# Patient Record
Sex: MALE | Race: WHITE | NOT HISPANIC OR LATINO | Employment: FULL TIME | URBAN - METROPOLITAN AREA
[De-identification: names, ages, dates, MRNs, and addresses within clinical notes are randomized per-mention and may not be internally consistent; named-entity substitution may affect disease eponyms.]

---

## 2018-01-13 NOTE — MISCELLANEOUS
Message   Recorded as Task   Date: 06/13/2016 09:15 AM, Created By: Dvaid Clay   Task Name: Miscellaneous   Assigned To: David Clay   Regarding Patient: Munir Allred, Status: Active   Comment:    David Clay - 13 Jun 2016 9:15 AM     TASK CREATED  PT WIFE CALLED TO CX PT APPT, HE WAS SENT TO [place] FOR WORK TODAY AND WASNT SURE THAT HE WOULD MAKE IT BACK IN TIME FOR THE APPT  HE WILL CB TO RESCHED WHEN HE IS BACK IN [place]  Sudhir Le - 13 Jun 2016 9:32 AM     TASK REPLIED TO: Previously Assigned To Sudhir Le  aware     Signatures   Electronically signed by :  Nely Gil, ; Jun 13 2016  9:34AM EST                       (Author)    Electronically signed by : Isabelle Barid DO; Jun 13 2016 10:05AM EST

## 2020-11-24 ENCOUNTER — NURSE TRIAGE (OUTPATIENT)
Dept: OTHER | Facility: OTHER | Age: 50
End: 2020-11-24

## 2020-11-24 DIAGNOSIS — Z20.822 EXPOSURE TO COVID-19 VIRUS: ICD-10-CM

## 2020-11-24 DIAGNOSIS — Z20.822 EXPOSURE TO COVID-19 VIRUS: Primary | ICD-10-CM

## 2020-11-24 PROCEDURE — U0003 INFECTIOUS AGENT DETECTION BY NUCLEIC ACID (DNA OR RNA); SEVERE ACUTE RESPIRATORY SYNDROME CORONAVIRUS 2 (SARS-COV-2) (CORONAVIRUS DISEASE [COVID-19]), AMPLIFIED PROBE TECHNIQUE, MAKING USE OF HIGH THROUGHPUT TECHNOLOGIES AS DESCRIBED BY CMS-2020-01-R: HCPCS | Performed by: FAMILY MEDICINE

## 2020-11-25 LAB — SARS-COV-2 RNA SPEC QL NAA+PROBE: NOT DETECTED

## 2020-12-11 ENCOUNTER — TELEMEDICINE (OUTPATIENT)
Dept: GASTROENTEROLOGY | Facility: CLINIC | Age: 50
End: 2020-12-11

## 2020-12-11 VITALS — WEIGHT: 200 LBS | HEIGHT: 66 IN | BODY MASS INDEX: 32.14 KG/M2

## 2020-12-11 DIAGNOSIS — Z83.71 FAMILY HISTORY OF COLONIC POLYPS: Primary | ICD-10-CM

## 2020-12-11 RX ORDER — CHLORAL HYDRATE 500 MG
1000 CAPSULE ORAL
COMMUNITY

## 2020-12-11 RX ORDER — PSYLLIUM HUSK 0.4 G
3000 CAPSULE ORAL DAILY
COMMUNITY

## 2020-12-11 RX ORDER — SODIUM, POTASSIUM,MAG SULFATES 17.5-3.13G
SOLUTION, RECONSTITUTED, ORAL ORAL
Qty: 2 BOTTLE | Refills: 0 | Status: SHIPPED | OUTPATIENT
Start: 2020-12-11 | End: 2020-12-18 | Stop reason: HOSPADM

## 2020-12-11 RX ORDER — LISINOPRIL 10 MG/1
10 TABLET ORAL DAILY
COMMUNITY
Start: 2020-11-11

## 2020-12-11 RX ORDER — DULAGLUTIDE 0.75 MG/.5ML
INJECTION, SOLUTION SUBCUTANEOUS
COMMUNITY
Start: 2020-10-13

## 2020-12-11 RX ORDER — SIMVASTATIN 10 MG
10 TABLET ORAL EVERY EVENING
COMMUNITY
Start: 2020-11-11

## 2020-12-18 ENCOUNTER — ANESTHESIA (OUTPATIENT)
Dept: GASTROENTEROLOGY | Facility: AMBULATORY SURGERY CENTER | Age: 50
End: 2020-12-18

## 2020-12-18 ENCOUNTER — HOSPITAL ENCOUNTER (OUTPATIENT)
Dept: GASTROENTEROLOGY | Facility: AMBULATORY SURGERY CENTER | Age: 50
Discharge: HOME | End: 2020-12-18
Payer: COMMERCIAL

## 2020-12-18 ENCOUNTER — ANESTHESIA EVENT (OUTPATIENT)
Dept: GASTROENTEROLOGY | Facility: AMBULATORY SURGERY CENTER | Age: 50
End: 2020-12-18

## 2020-12-18 VITALS
RESPIRATION RATE: 18 BRPM | DIASTOLIC BLOOD PRESSURE: 68 MMHG | OXYGEN SATURATION: 97 % | HEART RATE: 71 BPM | SYSTOLIC BLOOD PRESSURE: 129 MMHG

## 2020-12-18 VITALS — HEART RATE: 90 BPM

## 2020-12-18 DIAGNOSIS — Z12.11 SCREENING FOR COLON CANCER: ICD-10-CM

## 2020-12-18 PROBLEM — E66.811 OBESITY (BMI 30.0-34.9): Status: ACTIVE | Noted: 2020-12-18

## 2020-12-18 PROBLEM — E78.5 HYPERLIPEMIA: Status: ACTIVE | Noted: 2020-12-18

## 2020-12-18 PROBLEM — E83.119 HEMOCHROMATOSIS: Status: ACTIVE | Noted: 2020-12-18

## 2020-12-18 PROBLEM — E11.9 DM (DIABETES MELLITUS) (HCC): Status: ACTIVE | Noted: 2020-12-18

## 2020-12-18 PROBLEM — E66.9 OBESITY (BMI 30.0-34.9): Status: ACTIVE | Noted: 2020-12-18

## 2020-12-18 PROCEDURE — 88305 TISSUE EXAM BY PATHOLOGIST: CPT | Performed by: PATHOLOGY

## 2020-12-18 PROCEDURE — 45385 COLONOSCOPY W/LESION REMOVAL: CPT | Performed by: INTERNAL MEDICINE

## 2020-12-18 PROCEDURE — 45380 COLONOSCOPY AND BIOPSY: CPT | Performed by: INTERNAL MEDICINE

## 2020-12-18 RX ORDER — PROPOFOL 10 MG/ML
INJECTION, EMULSION INTRAVENOUS AS NEEDED
Status: DISCONTINUED | OUTPATIENT
Start: 2020-12-18 | End: 2020-12-18

## 2020-12-18 RX ORDER — SODIUM CHLORIDE 9 MG/ML
75 INJECTION, SOLUTION INTRAVENOUS CONTINUOUS
Status: DISCONTINUED | OUTPATIENT
Start: 2020-12-18 | End: 2020-12-22 | Stop reason: HOSPADM

## 2020-12-18 RX ADMIN — PROPOFOL 100 MG: 10 INJECTION, EMULSION INTRAVENOUS at 09:29

## 2020-12-18 RX ADMIN — PROPOFOL 50 MG: 10 INJECTION, EMULSION INTRAVENOUS at 09:39

## 2020-12-18 RX ADMIN — SODIUM CHLORIDE 75 ML/HR: 9 INJECTION, SOLUTION INTRAVENOUS at 09:24

## 2020-12-18 RX ADMIN — PROPOFOL 50 MG: 10 INJECTION, EMULSION INTRAVENOUS at 09:32

## 2020-12-18 RX ADMIN — PROPOFOL 50 MG: 10 INJECTION, EMULSION INTRAVENOUS at 09:35

## 2025-07-07 ENCOUNTER — CONSULT (OUTPATIENT)
Dept: GASTROENTEROLOGY | Facility: CLINIC | Age: 55
End: 2025-07-07
Payer: COMMERCIAL

## 2025-07-07 ENCOUNTER — TELEPHONE (OUTPATIENT)
Dept: GASTROENTEROLOGY | Facility: CLINIC | Age: 55
End: 2025-07-07

## 2025-07-07 VITALS
HEIGHT: 66 IN | DIASTOLIC BLOOD PRESSURE: 56 MMHG | BODY MASS INDEX: 32.62 KG/M2 | SYSTOLIC BLOOD PRESSURE: 138 MMHG | WEIGHT: 203 LBS

## 2025-07-07 DIAGNOSIS — K62.5 RECTAL BLEEDING: Primary | ICD-10-CM

## 2025-07-07 DIAGNOSIS — E83.119 HEMOCHROMATOSIS, UNSPECIFIED HEMOCHROMATOSIS TYPE: ICD-10-CM

## 2025-07-07 DIAGNOSIS — D45 POLYCYTHEMIA VERA (HCC): ICD-10-CM

## 2025-07-07 DIAGNOSIS — Z86.0100 HISTORY OF COLON POLYPS: ICD-10-CM

## 2025-07-07 PROBLEM — E83.118 OTHER HEMOCHROMATOSIS: Status: ACTIVE | Noted: 2020-12-18

## 2025-07-07 PROCEDURE — 99243 OFF/OP CNSLTJ NEW/EST LOW 30: CPT | Performed by: INTERNAL MEDICINE

## 2025-07-07 RX ORDER — SODIUM CHLORIDE, SODIUM LACTATE, POTASSIUM CHLORIDE, CALCIUM CHLORIDE 600; 310; 30; 20 MG/100ML; MG/100ML; MG/100ML; MG/100ML
125 INJECTION, SOLUTION INTRAVENOUS CONTINUOUS
Status: CANCELLED | OUTPATIENT
Start: 2025-07-07

## 2025-07-07 RX ORDER — DAPAGLIFLOZIN 10 MG/1
TABLET, FILM COATED ORAL
COMMUNITY
Start: 2025-06-27

## 2025-07-07 NOTE — PROGRESS NOTES
[facility] - Outpatient Consultation  Umberto Pendleton Jr. 54 y.o. male MRN: [id number]  Encounter: [id number]    ASSESSMENT AND PLAN:      1. Rectal bleeding (Primary)  54-year-old male with a history of colon polyps last colonoscopy 5 years ago with worsening painless rectal bleeding.  The story sounds consistent with hemorrhoidal bleeding.  He is due for surveillance colonoscopy for his polyps anyway plan to schedule colonoscopy we discussed the merits of fiber with respect to hemorrhoids.  I also discussed the option of banding.  For starters proceed with colonoscopy further recommendations to follow  - Colonoscopy; Future  - sodium picosulfate, magnesium oxide, citric acid (Clenpiq) oral solution; Take 175 mL (1 bottle) the evening before the colonoscopy, between 5 PM and 9 PM, followed by a second 175 mL bottle 5 hours before the colonoscopy.  Dispense: 350 mL; Refill: 0    2. History of colon polyps  Adenomas 2028 recall colonoscopy scheduled    3. Polycythemia vera (HCC)  Has a history of this treated with the Besemi which is apparently a type of interferon    4. Hemochromatosis, unspecified hemochromatosis type  History of this has received phlebotomy in the past thought to be related to his P vera.  His LFTs are normal and CAT scan in May did not show liver disease.  I cannot find a compelling reason to do upper endoscopy with respect to esophageal varices.      Followup Appointment: 2 to 3 months  ______________________________________________________________________    Chief Complaint   Patient presents with    Rectal Bleeding     Pt has been having rectal bleeding on and off for a yr / may have a hemorrhoid        HPI:   Umberto Pendleton Jr. is a very pleasant 54 y.o. year old male who presents with rectal bleeding.  He reports over the last several months he has noticed intermittent rectal bleeding usually without any warning.  He will notice a spot either  "on his clothing or where he has been sitting.  He denies any problems with constipation or diarrhea.  In May he had a fairly large episode and was evaluated in the emergency room.  CT scan was negative blood count was normal.  He had colonoscopy 5 years ago with removal of several polyps by Dr. Storm.  Presently no abdominal pain no upper tract symptoms.    Historical Information   Past Medical History[1]  Past Surgical History[2]  Social History     Substance and Sexual Activity   Alcohol Use Yes    Comment: social     Social History     Substance and Sexual Activity   Drug Use Never     Tobacco Use History[3]  Family History[4]    Meds/Allergies   Current Medications[5]    Allergies[6]    PHYSICAL EXAM:    Blood pressure 138/56, height 5' 6\" (1.676 m), weight 92.1 kg (203 lb). Body mass index is 32.77 kg/m².  General Appearance: NAD, cooperative, alert  Eyes: Anicteric, conjunctiva pink   ENT:  Normocephalic, atraumatic, normal mucosa.    Neck:  Supple, symmetrical, trachea midline,   Resp:  Clear to auscultation bilaterally; no rales, rhonchi or wheezing; respirations unlabored   CV:  S1 S2, Regular rate and rhythm; no murmur, rub, or gallop.  GI:  Soft, non-tender, non-distended; normal bowel sounds; no masses, no organomegaly   Rectal: Deferred  Musculoskeletal: No cyanosis, clubbing or edema. Normal ROM.  Skin:  No jaundice, rashes, or lesions   Heme/Lymph: No palpable cervical lymphadenopathy  Psych: Normal affect, good eye contact  Neuro: No gross deficits, AAOx3    Lab Results:   No results found for: \"WBC\", \"HGB\", \"HCT\", \"MCV\", \"PLT\"  Lab Results   Component Value Date    K 4.8 07/01/2025     (L) 07/01/2025    CO2 30 07/01/2025    BUN 26 (H) 07/01/2025    CREATININE 1.35 (H) 07/01/2025    CALCIUM 10.1 07/01/2025    AST 20 07/01/2025    ALT 26 07/01/2025    ALKPHOS 48 07/01/2025    EGFR 62 07/01/2025         Radiology Results:   No results found.      REVIEW OF SYSTEMS:    CONSTITUTIONAL: Denies any " fever, chills, rigors, and weight loss.  HEENT: No earache or tinnitus. Denies hearing loss or visual disturbances.  CARDIOVASCULAR: No chest pain or palpitations.   RESPIRATORY: Denies any cough, hemoptysis, shortness of breath or dyspnea on exertion.  GASTROINTESTINAL: As noted in the History of Present Illness.   GENITOURINARY: No problems with urination. Denies any hematuria or dysuria.  NEUROLOGIC: No dizziness or vertigo, denies headaches.   MUSCULOSKELETAL: Denies any muscle or joint pain.   SKIN: Denies skin rashes or itching.   ENDOCRINE: Denies excessive thirst. Denies intolerance to heat or cold.  PSYCHOSOCIAL: Denies depression or anxiety. Denies any recent memory loss.          [1]   Past Medical History:  Diagnosis Date    Diabetes mellitus (HCC)     type 2    GI (gastrointestinal bleed)     Hemochromatosis     Hyperlipemia 12/18/2020    Hypertension     Polycythemia    [2]   Past Surgical History:  Procedure Laterality Date    COLONOSCOPY  12/18/2020    COLONOSCOPY      HAND SURGERY  1986   [3]   Social History  Tobacco Use   Smoking Status Former    Current packs/day: 0.50    Average packs/day: 0.5 packs/day for 5.0 years (2.5 ttl pk-yrs)    Types: Cigarettes, Cigars   Smokeless Tobacco Former    Types: Chew   [4]   Family History  Problem Relation Name Age of Onset    Colon polyps Mother Demetra     Colon cancer Paternal Grandfather      Diabetes Father Umberto     Cancer Father Umberto     Hypertension Father Umberto     COPD Father Umberto     Early death Father Umberto         age 52    Heart disease Father Umberto     Kidney disease Father Umberto     No Known Problems Sister      No Known Problems Brother     [5]   Current Outpatient Medications:     Aspirin Buf,CaCarb-MgCarb-MgO, 81 MG TABS    Cholecalciferol (Vitamin D-1000 Max St) 25 MCG (1000 UT) tablet    dapagliflozin (Farxiga) 10 MG tablet    lisinopril (ZESTRIL) 10 mg tablet    metFORMIN (GLUCOPHAGE) 1000 MG tablet    Omega-3 Fatty Acids  (fish oil) 1,000 mg    Ropeginterferon wpnx-3o-vskd (BESREMi) 500 MCG/ML subcutaneous injection    simvastatin (ZOCOR) 10 mg tablet    sodium picosulfate, magnesium oxide, citric acid (Clenpiq) oral solution    Trulicity 0.75 MG/0.5ML SOPN  [6]   Allergies  Allergen Reactions    Oxycodone-Acetaminophen Other (See Comments)     Other reaction(s): violent dreams  agitation      Penicillins      Other reaction(s): vomiting

## 2025-07-07 NOTE — H&P (VIEW-ONLY)
[facility] - Outpatient Consultation  Umberto Pendleton Jr. 54 y.o. male MRN: [id number]  Encounter: [id number]    ASSESSMENT AND PLAN:      1. Rectal bleeding (Primary)  54-year-old male with a history of colon polyps last colonoscopy 5 years ago with worsening painless rectal bleeding.  The story sounds consistent with hemorrhoidal bleeding.  He is due for surveillance colonoscopy for his polyps anyway plan to schedule colonoscopy we discussed the merits of fiber with respect to hemorrhoids.  I also discussed the option of banding.  For starters proceed with colonoscopy further recommendations to follow  - Colonoscopy; Future  - sodium picosulfate, magnesium oxide, citric acid (Clenpiq) oral solution; Take 175 mL (1 bottle) the evening before the colonoscopy, between 5 PM and 9 PM, followed by a second 175 mL bottle 5 hours before the colonoscopy.  Dispense: 350 mL; Refill: 0    2. History of colon polyps  Adenomas 2028 recall colonoscopy scheduled    3. Polycythemia vera (HCC)  Has a history of this treated with the Besemi which is apparently a type of interferon    4. Hemochromatosis, unspecified hemochromatosis type  History of this has received phlebotomy in the past thought to be related to his P vera.  His LFTs are normal and CAT scan in May did not show liver disease.  I cannot find a compelling reason to do upper endoscopy with respect to esophageal varices.      Followup Appointment: 2 to 3 months  ______________________________________________________________________    Chief Complaint   Patient presents with    Rectal Bleeding     Pt has been having rectal bleeding on and off for a yr / may have a hemorrhoid        HPI:   Umberto Pendleton Jr. is a very pleasant 54 y.o. year old male who presents with rectal bleeding.  He reports over the last several months he has noticed intermittent rectal bleeding usually without any warning.  He will notice a spot either  "on his clothing or where he has been sitting.  He denies any problems with constipation or diarrhea.  In May he had a fairly large episode and was evaluated in the emergency room.  CT scan was negative blood count was normal.  He had colonoscopy 5 years ago with removal of several polyps by Dr. Storm.  Presently no abdominal pain no upper tract symptoms.    Historical Information   Past Medical History[1]  Past Surgical History[2]  Social History     Substance and Sexual Activity   Alcohol Use Yes    Comment: social     Social History     Substance and Sexual Activity   Drug Use Never     Tobacco Use History[3]  Family History[4]    Meds/Allergies   Current Medications[5]    Allergies[6]    PHYSICAL EXAM:    Blood pressure 138/56, height 5' 6\" (1.676 m), weight 92.1 kg (203 lb). Body mass index is 32.77 kg/m².  General Appearance: NAD, cooperative, alert  Eyes: Anicteric, conjunctiva pink   ENT:  Normocephalic, atraumatic, normal mucosa.    Neck:  Supple, symmetrical, trachea midline,   Resp:  Clear to auscultation bilaterally; no rales, rhonchi or wheezing; respirations unlabored   CV:  S1 S2, Regular rate and rhythm; no murmur, rub, or gallop.  GI:  Soft, non-tender, non-distended; normal bowel sounds; no masses, no organomegaly   Rectal: Deferred  Musculoskeletal: No cyanosis, clubbing or edema. Normal ROM.  Skin:  No jaundice, rashes, or lesions   Heme/Lymph: No palpable cervical lymphadenopathy  Psych: Normal affect, good eye contact  Neuro: No gross deficits, AAOx3    Lab Results:   No results found for: \"WBC\", \"HGB\", \"HCT\", \"MCV\", \"PLT\"  Lab Results   Component Value Date    K 4.8 07/01/2025     (L) 07/01/2025    CO2 30 07/01/2025    BUN 26 (H) 07/01/2025    CREATININE 1.35 (H) 07/01/2025    CALCIUM 10.1 07/01/2025    AST 20 07/01/2025    ALT 26 07/01/2025    ALKPHOS 48 07/01/2025    EGFR 62 07/01/2025         Radiology Results:   No results found.      REVIEW OF SYSTEMS:    CONSTITUTIONAL: Denies any " fever, chills, rigors, and weight loss.  HEENT: No earache or tinnitus. Denies hearing loss or visual disturbances.  CARDIOVASCULAR: No chest pain or palpitations.   RESPIRATORY: Denies any cough, hemoptysis, shortness of breath or dyspnea on exertion.  GASTROINTESTINAL: As noted in the History of Present Illness.   GENITOURINARY: No problems with urination. Denies any hematuria or dysuria.  NEUROLOGIC: No dizziness or vertigo, denies headaches.   MUSCULOSKELETAL: Denies any muscle or joint pain.   SKIN: Denies skin rashes or itching.   ENDOCRINE: Denies excessive thirst. Denies intolerance to heat or cold.  PSYCHOSOCIAL: Denies depression or anxiety. Denies any recent memory loss.          [1]   Past Medical History:  Diagnosis Date    Diabetes mellitus (HCC)     type 2    GI (gastrointestinal bleed)     Hemochromatosis     Hyperlipemia 12/18/2020    Hypertension     Polycythemia    [2]   Past Surgical History:  Procedure Laterality Date    COLONOSCOPY  12/18/2020    COLONOSCOPY      HAND SURGERY  1986   [3]   Social History  Tobacco Use   Smoking Status Former    Current packs/day: 0.50    Average packs/day: 0.5 packs/day for 5.0 years (2.5 ttl pk-yrs)    Types: Cigarettes, Cigars   Smokeless Tobacco Former    Types: Chew   [4]   Family History  Problem Relation Name Age of Onset    Colon polyps Mother Demetra     Colon cancer Paternal Grandfather      Diabetes Father Umberto     Cancer Father Umberto     Hypertension Father Umberto     COPD Father Umberto     Early death Father Umberto         age 52    Heart disease Father Umberto     Kidney disease Father Umberto     No Known Problems Sister      No Known Problems Brother     [5]   Current Outpatient Medications:     Aspirin Buf,CaCarb-MgCarb-MgO, 81 MG TABS    Cholecalciferol (Vitamin D-1000 Max St) 25 MCG (1000 UT) tablet    dapagliflozin (Farxiga) 10 MG tablet    lisinopril (ZESTRIL) 10 mg tablet    metFORMIN (GLUCOPHAGE) 1000 MG tablet    Omega-3 Fatty Acids  (fish oil) 1,000 mg    Ropeginterferon pqem-6y-ltsd (BESREMi) 500 MCG/ML subcutaneous injection    simvastatin (ZOCOR) 10 mg tablet    sodium picosulfate, magnesium oxide, citric acid (Clenpiq) oral solution    Trulicity 0.75 MG/0.5ML SOPN  [6]   Allergies  Allergen Reactions    Oxycodone-Acetaminophen Other (See Comments)     Other reaction(s): violent dreams  agitation      Penicillins      Other reaction(s): vomiting      none

## 2025-07-08 ENCOUNTER — TELEPHONE (OUTPATIENT)
Dept: GASTROENTEROLOGY | Facility: CLINIC | Age: 55
End: 2025-07-08

## 2025-07-08 NOTE — TELEPHONE ENCOUNTER
Scheduled date of colonoscopy (as of today): 7/17/25  Physician performing colonoscopy: ROHIT  Location of colonoscopy: [facility]  Bowel prep reviewed with patient:  Instructions reviewed with patient by: MA  Clearances:     Diabetic hold for 4 days prior

## 2025-07-08 NOTE — TELEPHONE ENCOUNTER
Patients GI provider:  Dr. Salinas    Number to return call: [phone]    Reason for call: Lyssa from the referral department at [facility] called in stating per patient's insurance pt will not need a referral. Patient's wife called [facility] stating she spoke with someone from the office and they are stating patient needs one. Patient would like to know if they would need a prior auth for upcoming procedure. Please reach out to patient, thank you.    Scheduled procedure/appointment date if applicable: Apt/procedure 7/10/2025

## 2025-07-09 ENCOUNTER — TELEPHONE (OUTPATIENT)
Dept: GASTROENTEROLOGY | Facility: CLINIC | Age: 55
End: 2025-07-09

## 2025-07-09 NOTE — TELEPHONE ENCOUNTER
Procedure confirmed: Colonoscopy    Via: Spoke with Patient    Instructions given: Given to Patient at Visit    Prep Given: Clenpiq    Provider: Dr. Marvin Rosa    Date: 7-17-25    Location: [address]  [address]    Medication holds: Yes      Farxiga 4 day hold last dose taken to be 7-12-25      Diabetic: Yes     Call the office if there are any questions.       [phone]

## 2025-07-16 ENCOUNTER — TELEPHONE (OUTPATIENT)
Dept: SURGERY | Facility: HOSPITAL | Age: 55
End: 2025-07-16

## 2025-07-16 NOTE — TELEPHONE ENCOUNTER
Pt's wife calling back, advised of 0700AM arrival time. Reminded of need for  18yrs+, wife confirmed understanding. Reviewed restrictions for jewelry, clothing and personal hygiene products. No questions about prep.

## 2025-07-17 ENCOUNTER — HOSPITAL ENCOUNTER (OUTPATIENT)
Dept: GASTROENTEROLOGY | Facility: HOSPITAL | Age: 55
Setting detail: OUTPATIENT SURGERY
End: 2025-07-17
Attending: INTERNAL MEDICINE
Payer: COMMERCIAL

## 2025-07-17 ENCOUNTER — ANESTHESIA (OUTPATIENT)
Dept: GASTROENTEROLOGY | Facility: HOSPITAL | Age: 55
End: 2025-07-17
Payer: COMMERCIAL

## 2025-07-17 ENCOUNTER — ANESTHESIA EVENT (OUTPATIENT)
Dept: GASTROENTEROLOGY | Facility: HOSPITAL | Age: 55
End: 2025-07-17
Payer: COMMERCIAL

## 2025-07-17 VITALS
HEART RATE: 67 BPM | DIASTOLIC BLOOD PRESSURE: 72 MMHG | HEIGHT: 66 IN | BODY MASS INDEX: 32.14 KG/M2 | SYSTOLIC BLOOD PRESSURE: 121 MMHG | WEIGHT: 200 LBS | OXYGEN SATURATION: 96 % | RESPIRATION RATE: 12 BRPM | TEMPERATURE: 97 F

## 2025-07-17 DIAGNOSIS — K62.5 RECTAL BLEEDING: ICD-10-CM

## 2025-07-17 LAB — GLUCOSE SERPL-MCNC: 207 MG/DL (ref 65–140)

## 2025-07-17 PROCEDURE — 82948 REAGENT STRIP/BLOOD GLUCOSE: CPT

## 2025-07-17 PROCEDURE — 45385 COLONOSCOPY W/LESION REMOVAL: CPT | Performed by: INTERNAL MEDICINE

## 2025-07-17 PROCEDURE — 88305 TISSUE EXAM BY PATHOLOGIST: CPT | Performed by: SPECIALIST

## 2025-07-17 RX ORDER — PROPOFOL 10 MG/ML
INJECTION, EMULSION INTRAVENOUS AS NEEDED
Status: DISCONTINUED | OUTPATIENT
Start: 2025-07-17 | End: 2025-07-17

## 2025-07-17 RX ORDER — LIDOCAINE HYDROCHLORIDE 10 MG/ML
INJECTION, SOLUTION EPIDURAL; INFILTRATION; INTRACAUDAL; PERINEURAL AS NEEDED
Status: DISCONTINUED | OUTPATIENT
Start: 2025-07-17 | End: 2025-07-17

## 2025-07-17 RX ORDER — SODIUM CHLORIDE 9 MG/ML
INJECTION, SOLUTION INTRAVENOUS CONTINUOUS PRN
Status: DISCONTINUED | OUTPATIENT
Start: 2025-07-17 | End: 2025-07-17

## 2025-07-17 RX ADMIN — SODIUM CHLORIDE: 0.9 INJECTION, SOLUTION INTRAVENOUS at 08:13

## 2025-07-17 RX ADMIN — PROPOFOL 150 MG: 10 INJECTION, EMULSION INTRAVENOUS at 08:23

## 2025-07-17 RX ADMIN — PROPOFOL 50 MG: 10 INJECTION, EMULSION INTRAVENOUS at 08:30

## 2025-07-17 RX ADMIN — LIDOCAINE HYDROCHLORIDE 30 MG: 10 INJECTION, SOLUTION EPIDURAL; INFILTRATION; INTRACAUDAL; PERINEURAL at 08:23

## 2025-07-17 NOTE — ANESTHESIA PREPROCEDURE EVALUATION
Procedure:  COLONOSCOPY    Relevant Problems   CARDIO   (+) Hyperlipemia   (+) Hypertension      Endocrine   (+) DM (diabetes mellitus) (HCC)      Other   (+) Other hemochromatosis        Physical Exam    Airway     Mallampati score: II  TM Distance: >3 FB  Neck ROM: full      Cardiovascular      Dental   No notable dental hx     Pulmonary      Neurological      Other Findings        Anesthesia Plan  ASA Score- 2     Anesthesia Type- IV sedation with anesthesia with ASA Monitors.         Additional Monitors:     Airway Plan: natural airway.           Plan Factors-Exercise tolerance (METS): >4 METS.    Chart reviewed.    Patient summary reviewed.    Patient is not a current smoker.              Induction- intravenous.    Postoperative Plan- .   Monitoring Plan - Monitoring plan - standard ASA monitoring          Informed Consent- Anesthetic plan and risks discussed with patient.  I personally reviewed this patient with the CRNA. Discussed and agreed on the Anesthesia Plan with the CRNA..      NPO Status:  Vitals Value Taken Time   Date of last liquid 07/17/25 07/17/25 07:07   Time of last liquid 0200 07/17/25 07:07   Date of last solid 07/15/25 07/17/25 07:07   Time of last solid 1730 07/17/25 07:07

## 2025-07-17 NOTE — INTERVAL H&P NOTE
H&P reviewed. After examining the patient I find no changes in the patients condition since the H&P had been written.    Vitals:    07/17/25 0717   BP: 143/76   Pulse: 71   Resp: 18   Temp: (!) 97 °F (36.1 °C)   SpO2: 96%

## 2025-07-22 PROCEDURE — 88305 TISSUE EXAM BY PATHOLOGIST: CPT | Performed by: SPECIALIST
